# Patient Record
Sex: FEMALE | Race: WHITE | NOT HISPANIC OR LATINO | Employment: UNEMPLOYED | URBAN - METROPOLITAN AREA
[De-identification: names, ages, dates, MRNs, and addresses within clinical notes are randomized per-mention and may not be internally consistent; named-entity substitution may affect disease eponyms.]

---

## 2019-04-30 ENCOUNTER — INITIAL PRENATAL (OUTPATIENT)
Dept: OBSTETRICS AND GYNECOLOGY | Facility: CLINIC | Age: 24
End: 2019-04-30
Payer: MEDICAID

## 2019-04-30 VITALS — DIASTOLIC BLOOD PRESSURE: 60 MMHG | WEIGHT: 174.19 LBS | SYSTOLIC BLOOD PRESSURE: 102 MMHG

## 2019-04-30 DIAGNOSIS — O09.899 HISTORY OF PRETERM DELIVERY, CURRENTLY PREGNANT: ICD-10-CM

## 2019-04-30 DIAGNOSIS — O09.299: ICD-10-CM

## 2019-04-30 DIAGNOSIS — Z37.1 STILLBIRTH WITH ANTEPARTUM DEATH: ICD-10-CM

## 2019-04-30 PROCEDURE — 99202 OFFICE O/P NEW SF 15 MIN: CPT | Mod: PBBFAC,TH,PN | Performed by: ADVANCED PRACTICE MIDWIFE

## 2019-04-30 PROCEDURE — 99999 PR PBB SHADOW E&M-NEW PATIENT-LVL II: CPT | Mod: PBBFAC,,, | Performed by: ADVANCED PRACTICE MIDWIFE

## 2019-04-30 PROCEDURE — 99999 PR PBB SHADOW E&M-NEW PATIENT-LVL II: ICD-10-PCS | Mod: PBBFAC,,, | Performed by: ADVANCED PRACTICE MIDWIFE

## 2019-04-30 PROCEDURE — 99204 OFFICE O/P NEW MOD 45 MIN: CPT | Mod: S$PBB,TH,, | Performed by: ADVANCED PRACTICE MIDWIFE

## 2019-04-30 PROCEDURE — 99204 PR OFFICE/OUTPT VISIT, NEW, LEVL IV, 45-59 MIN: ICD-10-PCS | Mod: S$PBB,TH,, | Performed by: ADVANCED PRACTICE MIDWIFE

## 2019-04-30 NOTE — PROGRESS NOTES
Transfer from [place] Records requested  Reports prior still birth at 25 weeks and  at 35 weeks refused 17 Hydroxy progesterone  Will begin weekly BPP's  GBS next visit   Will redraw labs and genprobe if not received by next visit

## 2019-05-08 ENCOUNTER — LAB VISIT (OUTPATIENT)
Dept: LAB | Facility: HOSPITAL | Age: 24
End: 2019-05-08
Attending: ADVANCED PRACTICE MIDWIFE
Payer: MEDICAID

## 2019-05-08 ENCOUNTER — PROCEDURE VISIT (OUTPATIENT)
Dept: OBSTETRICS AND GYNECOLOGY | Facility: CLINIC | Age: 24
End: 2019-05-08
Payer: MEDICAID

## 2019-05-08 ENCOUNTER — ROUTINE PRENATAL (OUTPATIENT)
Dept: OBSTETRICS AND GYNECOLOGY | Facility: CLINIC | Age: 24
End: 2019-05-08
Payer: MEDICAID

## 2019-05-08 VITALS
WEIGHT: 173.75 LBS | DIASTOLIC BLOOD PRESSURE: 64 MMHG | HEIGHT: 64 IN | SYSTOLIC BLOOD PRESSURE: 114 MMHG | BODY MASS INDEX: 29.66 KG/M2

## 2019-05-08 DIAGNOSIS — O09.299: ICD-10-CM

## 2019-05-08 DIAGNOSIS — Z34.83 ENCOUNTER FOR SUPERVISION OF OTHER NORMAL PREGNANCY IN THIRD TRIMESTER: ICD-10-CM

## 2019-05-08 DIAGNOSIS — Z34.83 ENCOUNTER FOR SUPERVISION OF OTHER NORMAL PREGNANCY IN THIRD TRIMESTER: Primary | ICD-10-CM

## 2019-05-08 LAB
ABO + RH BLD: NORMAL
BASOPHILS # BLD AUTO: 0.03 K/UL (ref 0–0.2)
BASOPHILS NFR BLD: 0.4 % (ref 0–1.9)
BLD GP AB SCN CELLS X3 SERPL QL: NORMAL
BLOOD GROUP ANTIBODIES SERPL: NORMAL
DIFFERENTIAL METHOD: ABNORMAL
EOSINOPHIL # BLD AUTO: 0 K/UL (ref 0–0.5)
EOSINOPHIL NFR BLD: 0.4 % (ref 0–8)
ERYTHROCYTE [DISTWIDTH] IN BLOOD BY AUTOMATED COUNT: 13 % (ref 11.5–14.5)
ESTIMATED AVG GLUCOSE: 80 MG/DL (ref 68–131)
GLUCOSE SERPL-MCNC: 74 MG/DL (ref 70–110)
HBA1C MFR BLD HPLC: 4.4 % (ref 4–5.6)
HCT VFR BLD AUTO: 36.4 % (ref 37–48.5)
HGB BLD-MCNC: 13.2 G/DL (ref 12–16)
IMM GRANULOCYTES # BLD AUTO: 0.03 K/UL (ref 0–0.04)
IMM GRANULOCYTES NFR BLD AUTO: 0.4 % (ref 0–0.5)
LYMPHOCYTES # BLD AUTO: 2.3 K/UL (ref 1–4.8)
LYMPHOCYTES NFR BLD: 32.4 % (ref 18–48)
MCH RBC QN AUTO: 34.2 PG (ref 27–31)
MCHC RBC AUTO-ENTMCNC: 36.3 G/DL (ref 32–36)
MCV RBC AUTO: 94 FL (ref 82–98)
MONOCYTES # BLD AUTO: 0.6 K/UL (ref 0.3–1)
MONOCYTES NFR BLD: 8.6 % (ref 4–15)
NEUTROPHILS # BLD AUTO: 4 K/UL (ref 1.8–7.7)
NEUTROPHILS NFR BLD: 57.8 % (ref 38–73)
NRBC BLD-RTO: 0 /100 WBC
PLATELET # BLD AUTO: 249 K/UL (ref 150–350)
PMV BLD AUTO: 10.2 FL (ref 9.2–12.9)
RBC # BLD AUTO: 3.86 M/UL (ref 4–5.4)
RH BLD: NORMAL
WBC # BLD AUTO: 6.94 K/UL (ref 3.9–12.7)

## 2019-05-08 PROCEDURE — 86762 RUBELLA ANTIBODY: CPT

## 2019-05-08 PROCEDURE — 86703 HIV-1/HIV-2 1 RESULT ANTBDY: CPT

## 2019-05-08 PROCEDURE — 86592 SYPHILIS TEST NON-TREP QUAL: CPT

## 2019-05-08 PROCEDURE — 86901 BLOOD TYPING SEROLOGIC RH(D): CPT

## 2019-05-08 PROCEDURE — 99999 PR PBB SHADOW E&M-EST. PATIENT-LVL II: ICD-10-PCS | Mod: PBBFAC,,, | Performed by: ADVANCED PRACTICE MIDWIFE

## 2019-05-08 PROCEDURE — 82947 ASSAY GLUCOSE BLOOD QUANT: CPT

## 2019-05-08 PROCEDURE — 99213 OFFICE O/P EST LOW 20 MIN: CPT | Mod: TH,S$PBB,, | Performed by: ADVANCED PRACTICE MIDWIFE

## 2019-05-08 PROCEDURE — 76816 OB US FOLLOW-UP PER FETUS: CPT | Mod: 59,PBBFAC | Performed by: OBSTETRICS & GYNECOLOGY

## 2019-05-08 PROCEDURE — 76819 FETAL BIOPHYS PROFIL W/O NST: CPT | Mod: 59,PBBFAC | Performed by: OBSTETRICS & GYNECOLOGY

## 2019-05-08 PROCEDURE — 76816 PR  US,PREGNANT UTERUS,F/U,TRANSABD APP: ICD-10-PCS | Mod: 26,59,S$PBB, | Performed by: OBSTETRICS & GYNECOLOGY

## 2019-05-08 PROCEDURE — 87340 HEPATITIS B SURFACE AG IA: CPT

## 2019-05-08 PROCEDURE — 99999 PR PBB SHADOW E&M-EST. PATIENT-LVL II: CPT | Mod: PBBFAC,,, | Performed by: ADVANCED PRACTICE MIDWIFE

## 2019-05-08 PROCEDURE — 76819 PR US, OB, FETAL BIOPHYSICAL, W/O NST: ICD-10-PCS | Mod: 26,59,S$PBB, | Performed by: OBSTETRICS & GYNECOLOGY

## 2019-05-08 PROCEDURE — 85025 COMPLETE CBC W/AUTO DIFF WBC: CPT

## 2019-05-08 PROCEDURE — 76819 FETAL BIOPHYS PROFIL W/O NST: CPT | Mod: 26,59,S$PBB, | Performed by: OBSTETRICS & GYNECOLOGY

## 2019-05-08 PROCEDURE — 76816 OB US FOLLOW-UP PER FETUS: CPT | Mod: 26,59,S$PBB, | Performed by: OBSTETRICS & GYNECOLOGY

## 2019-05-08 PROCEDURE — 83036 HEMOGLOBIN GLYCOSYLATED A1C: CPT

## 2019-05-08 PROCEDURE — 86870 RBC ANTIBODY IDENTIFICATION: CPT

## 2019-05-08 PROCEDURE — 86901 BLOOD TYPING SEROLOGIC RH(D): CPT | Mod: 91

## 2019-05-08 PROCEDURE — 99212 OFFICE O/P EST SF 10 MIN: CPT | Mod: PBBFAC,TH | Performed by: ADVANCED PRACTICE MIDWIFE

## 2019-05-08 PROCEDURE — 36415 COLL VENOUS BLD VENIPUNCTURE: CPT

## 2019-05-08 PROCEDURE — 99213 PR OFFICE/OUTPT VISIT, EST, LEVL III, 20-29 MIN: ICD-10-PCS | Mod: TH,S$PBB,, | Performed by: ADVANCED PRACTICE MIDWIFE

## 2019-05-08 PROCEDURE — 87081 CULTURE SCREEN ONLY: CPT

## 2019-05-08 NOTE — PROGRESS NOTES
Doing well  Records reviewed. None pertinent to the current pregnancy. Will draw all labs today.   Pt states she did have rhogam  Questions answered  GBS collected  PTL talk  Gen probe next visit  BPP 8/8 with good growth

## 2019-05-09 LAB
HBV SURFACE AG SERPL QL IA: NEGATIVE
HIV 1+2 AB+HIV1 P24 AG SERPL QL IA: NEGATIVE
RPR SER QL: NORMAL
RUBV IGG SER-ACNC: 19.6 IU/ML
RUBV IGG SER-IMP: REACTIVE

## 2019-05-10 LAB — BACTERIA SPEC AEROBE CULT: NORMAL

## 2019-05-16 ENCOUNTER — ROUTINE PRENATAL (OUTPATIENT)
Dept: OBSTETRICS AND GYNECOLOGY | Facility: CLINIC | Age: 24
End: 2019-05-16
Payer: MEDICAID

## 2019-05-16 VITALS
SYSTOLIC BLOOD PRESSURE: 100 MMHG | WEIGHT: 174.38 LBS | DIASTOLIC BLOOD PRESSURE: 62 MMHG | BODY MASS INDEX: 29.93 KG/M2

## 2019-05-16 DIAGNOSIS — Z34.93 NORMAL PREGNANCY IN THIRD TRIMESTER: Primary | ICD-10-CM

## 2019-05-16 PROCEDURE — 99212 OFFICE O/P EST SF 10 MIN: CPT | Mod: TH,S$PBB,, | Performed by: ADVANCED PRACTICE MIDWIFE

## 2019-05-16 PROCEDURE — 87491 CHLMYD TRACH DNA AMP PROBE: CPT

## 2019-05-16 PROCEDURE — 99212 OFFICE O/P EST SF 10 MIN: CPT | Mod: PBBFAC | Performed by: ADVANCED PRACTICE MIDWIFE

## 2019-05-16 PROCEDURE — 99212 PR OFFICE/OUTPT VISIT, EST, LEVL II, 10-19 MIN: ICD-10-PCS | Mod: TH,S$PBB,, | Performed by: ADVANCED PRACTICE MIDWIFE

## 2019-05-16 PROCEDURE — 99999 PR PBB SHADOW E&M-EST. PATIENT-LVL II: ICD-10-PCS | Mod: PBBFAC,,, | Performed by: ADVANCED PRACTICE MIDWIFE

## 2019-05-16 PROCEDURE — 99999 PR PBB SHADOW E&M-EST. PATIENT-LVL II: CPT | Mod: PBBFAC,,, | Performed by: ADVANCED PRACTICE MIDWIFE

## 2019-05-16 RX ORDER — CYCLOBENZAPRINE HCL 10 MG
10 TABLET ORAL 3 TIMES DAILY PRN
Qty: 30 TABLET | Refills: 0 | Status: SHIPPED | OUTPATIENT
Start: 2019-05-16 | End: 2019-05-26

## 2019-05-17 LAB
C TRACH DNA SPEC QL NAA+PROBE: NOT DETECTED
N GONORRHOEA DNA SPEC QL NAA+PROBE: NOT DETECTED

## 2019-06-19 ENCOUNTER — TELEPHONE (OUTPATIENT)
Dept: PHARMACY | Facility: CLINIC | Age: 24
End: 2019-06-19

## 2020-04-06 PROBLEM — O09.299: Status: RESOLVED | Noted: 2019-04-30 | Resolved: 2020-04-06
